# Patient Record
Sex: MALE | Race: WHITE | Employment: FULL TIME | URBAN - METROPOLITAN AREA
[De-identification: names, ages, dates, MRNs, and addresses within clinical notes are randomized per-mention and may not be internally consistent; named-entity substitution may affect disease eponyms.]

---

## 2021-03-10 ENCOUNTER — HOSPITAL ENCOUNTER (OUTPATIENT)
Dept: GENERAL RADIOLOGY | Age: 26
Discharge: HOME | End: 2021-03-12
Payer: COMMERCIAL

## 2021-03-10 ENCOUNTER — HOSPITAL ENCOUNTER (OUTPATIENT)
Dept: NON INVASIVE DIAGNOSTICS | Age: 26
Discharge: HOME | End: 2021-03-10
Payer: COMMERCIAL

## 2021-03-10 VITALS — HEIGHT: 72 IN | BODY MASS INDEX: 28.85 KG/M2 | WEIGHT: 213 LBS

## 2021-03-10 DIAGNOSIS — Z02.1 PRE-EMPLOYMENT HEALTH SCREENING EXAMINATION: ICD-10-CM

## 2021-03-10 DIAGNOSIS — R52 PAIN: ICD-10-CM

## 2021-03-10 PROCEDURE — 71046 X-RAY EXAM CHEST 2 VIEWS: CPT

## 2021-03-10 PROCEDURE — 93017 CV STRESS TEST TRACING ONLY: CPT

## 2021-03-10 NOTE — PROGRESS NOTES
EMAR MARKED ADMINISTERED AND SCANNED PT THEN REFUSED WHEN I TRIED TO GIVE HER BREATHING Tx 
(DUONEB) PT IS REFUSING BREATHING Tx FOR DURATION OF VISIT PER PT DR ESTES WAS INFORMED Hx,allergies and medications reviewed. Procedure explained and informed consent obtained. Tolerated treadmill well for 13:19 minutes. Reached 87% target HR. SOB noted. Returned to baseline in recovery. Denies chest pain or pressure. EKG shows  No noted ectopy.

## 2021-03-17 NOTE — PROCEDURES
[address]                      [address], [address] [facility]                              CARDIAC STRESS TEST    PATIENT NAME: Matilda Walters                       :        1995  MED REC NO:   [id number]                            ROOM:  ACCOUNT NO:   [de-identified]                           ADMIT DATE: 03/10/2021  PROVIDER:     Ashley Quintero DO    CARDIOVASCULAR DIAGNOSTIC DEPARTMENT    DATE OF STUDY:  03/10/2021    TREADMILL STRESS EKG    ORDERING PROVIDER:  Keysha Anderson    REASON FOR EXAM:  Pre-employment. DESCRIPTION OF PROCEDURE:  The patient was exercised on Robin protocol  for 30 minutes and 19 seconds, achieving 17.2 METs of activity. Resting  heart rate of 57, maximum heart rate of 166 beats per minute, which  represents 87% of the maximum age-predicted heart rate. Resting blood  pressure 155/84 with a maximum blood pressure of 184/70. Exercise  stress test was stopped secondary to end of protocol and fatigue. Baseline EKG showed normal sinus rhythm without any evidence of  ischemia. Baseline EKG showed normal variant T-wave inversion in the  inferior leads. EKG at peak exercise as well as in recovery did not  show any significant changes from baseline. There was no evidence of  any malignant tachy or bradyarrhythmias or any conduction abnormalities. IMPRESSION:  1. Negative maximal treadmill stress EKG. No ischemic EKG changes were  seen. 2.  Normal hemodynamic response to exercise. 3.  Normal functional capacity for age. 4.  No evidence of any malignant tachy or bradyarrhythmias. 5.  No reported chest pain, chest pressure, or syncope.         Nathalie Foy DO    D: 2021 #14:41:07       T: 2021 16:11:28     [email or web address]  Job#: [id number]     Doc#: [id number]    CC: